# Patient Record
Sex: FEMALE | Employment: UNEMPLOYED | URBAN - METROPOLITAN AREA
[De-identification: names, ages, dates, MRNs, and addresses within clinical notes are randomized per-mention and may not be internally consistent; named-entity substitution may affect disease eponyms.]

---

## 2020-06-27 ENCOUNTER — CLINICAL SUPPORT (OUTPATIENT)
Dept: URGENT CARE | Facility: CLINIC | Age: 25
End: 2020-06-27
Payer: COMMERCIAL

## 2020-06-27 VITALS — TEMPERATURE: 98 F

## 2020-06-27 DIAGNOSIS — Z11.59 ENCOUNTER FOR SCREENING FOR OTHER VIRAL DISEASES: ICD-10-CM

## 2020-06-27 PROCEDURE — U0003 INFECTIOUS AGENT DETECTION BY NUCLEIC ACID (DNA OR RNA); SEVERE ACUTE RESPIRATORY SYNDROME CORONAVIRUS 2 (SARS-COV-2) (CORONAVIRUS DISEASE [COVID-19]), AMPLIFIED PROBE TECHNIQUE, MAKING USE OF HIGH THROUGHPUT TECHNOLOGIES AS DESCRIBED BY CMS-2020-01-R: HCPCS

## 2020-06-27 NOTE — PROGRESS NOTES
Subjective:       Patient ID: Betsy Rodriguez is a 25 y.o. female.    Vitals:  tympanic temperature is 98.1 °F (36.7 °C).     Chief Complaint: COVID-19 Concerns (exposure)    Patient states she was exposed to COVID 19 but is asymptomatic.      ROS    Objective:      Physical Exam      Assessment:       No diagnosis found.    Plan:         There are no diagnoses linked to this encounter.

## 2020-07-02 LAB — SARS-COV-2 RNA RESP QL NAA+PROBE: NOT DETECTED
